# Patient Record
Sex: MALE | Race: BLACK OR AFRICAN AMERICAN | NOT HISPANIC OR LATINO | Employment: UNEMPLOYED | URBAN - METROPOLITAN AREA
[De-identification: names, ages, dates, MRNs, and addresses within clinical notes are randomized per-mention and may not be internally consistent; named-entity substitution may affect disease eponyms.]

---

## 2018-01-10 NOTE — RESULT NOTES
Message   tsh normalized, will monitor      Verified Results  (1) T4, FREE [id number] 04:41PM Montine Erni     Test Name Result Flag Reference   T4,FREE 1 03 ng/dL  0 76-1 46     (1) TSH [id number] 04:41PM Montine Erin   Patients undergoing fluorescein dye angiography may retain small amounts of fluorescein in the body for 48-72 hours post procedure  Samples containing fluorescein can produce falsely depressed TSH values  If the patient had this procedure,a specimen should be resubmitted post fluorescein clearance       Test Name Result Flag Reference   TSH 0 377 uIU/mL  0 358-3 740     (1) T3 TOTAL [id number] 04:41PM Montine Erin     Test Name Result Flag Reference   T3 1 2 ng/mL  0 6-1 8

## 2018-02-15 PROCEDURE — 81003 URINALYSIS AUTO W/O SCOPE: CPT | Performed by: FAMILY MEDICINE

## 2018-02-15 PROCEDURE — 87147 CULTURE TYPE IMMUNOLOGIC: CPT | Performed by: FAMILY MEDICINE

## 2018-02-15 PROCEDURE — 87186 SC STD MICRODIL/AGAR DIL: CPT | Performed by: FAMILY MEDICINE

## 2018-02-15 PROCEDURE — 87591 N.GONORRHOEAE DNA AMP PROB: CPT | Performed by: FAMILY MEDICINE

## 2018-02-15 PROCEDURE — 87077 CULTURE AEROBIC IDENTIFY: CPT | Performed by: FAMILY MEDICINE

## 2018-02-15 PROCEDURE — 87491 CHLMYD TRACH DNA AMP PROBE: CPT | Performed by: FAMILY MEDICINE

## 2018-02-15 PROCEDURE — 87086 URINE CULTURE/COLONY COUNT: CPT | Performed by: FAMILY MEDICINE

## 2018-02-20 ENCOUNTER — TELEPHONE (OUTPATIENT)
Dept: FAMILY MEDICINE CLINIC | Facility: CLINIC | Age: 28
End: 2018-02-20

## 2018-09-12 ENCOUNTER — APPOINTMENT (OUTPATIENT)
Dept: LAB | Age: 28
End: 2018-09-12
Payer: COMMERCIAL

## 2018-09-12 ENCOUNTER — OFFICE VISIT (OUTPATIENT)
Dept: FAMILY MEDICINE CLINIC | Facility: CLINIC | Age: 28
End: 2018-09-12
Payer: COMMERCIAL

## 2018-09-12 VITALS
SYSTOLIC BLOOD PRESSURE: 102 MMHG | HEIGHT: 71 IN | BODY MASS INDEX: 21.84 KG/M2 | WEIGHT: 156 LBS | DIASTOLIC BLOOD PRESSURE: 60 MMHG | TEMPERATURE: 97.5 F

## 2018-09-12 DIAGNOSIS — Z00.00 ROUTINE ADULT HEALTH MAINTENANCE: ICD-10-CM

## 2018-09-12 DIAGNOSIS — Z11.3 SCREENING EXAMINATION FOR STD (SEXUALLY TRANSMITTED DISEASE): ICD-10-CM

## 2018-09-12 DIAGNOSIS — Z13.220 LIPID SCREENING: Primary | ICD-10-CM

## 2018-09-12 DIAGNOSIS — Z13.220 LIPID SCREENING: ICD-10-CM

## 2018-09-12 LAB
CHOLEST SERPL-MCNC: 164 MG/DL (ref 50–200)
HDLC SERPL-MCNC: 47 MG/DL (ref 40–60)
LDLC SERPL CALC-MCNC: 108 MG/DL (ref 0–100)
TRIGL SERPL-MCNC: 47 MG/DL

## 2018-09-12 PROCEDURE — 86592 SYPHILIS TEST NON-TREP QUAL: CPT

## 2018-09-12 PROCEDURE — 87340 HEPATITIS B SURFACE AG IA: CPT

## 2018-09-12 PROCEDURE — 36415 COLL VENOUS BLD VENIPUNCTURE: CPT

## 2018-09-12 PROCEDURE — 87491 CHLMYD TRACH DNA AMP PROBE: CPT

## 2018-09-12 PROCEDURE — 87389 HIV-1 AG W/HIV-1&-2 AB AG IA: CPT

## 2018-09-12 PROCEDURE — 99395 PREV VISIT EST AGE 18-39: CPT | Performed by: FAMILY MEDICINE

## 2018-09-12 PROCEDURE — 80061 LIPID PANEL: CPT

## 2018-09-12 PROCEDURE — 87591 N.GONORRHOEAE DNA AMP PROB: CPT

## 2018-09-12 NOTE — PROGRESS NOTES
Assessment/Plan:    27-year-old male with: Annual health maintenance exam   Discussed various safety and health maintenance issues including healthy diet like the Mediterranean diet, exercise, healthy weight as tolerated, ample sleep and stress reduction  Discussed supportive care return parameters  Will check fasting blood work along with STD screening and call patient with results  Follow-up yearly and as needed  No problem-specific Assessment & Plan notes found for this encounter  Diagnoses and all orders for this visit:    Lipid screening  -     Lipid Panel with Direct LDL reflex; Future  -     Glucose, fasting; Future  -     HIV 1/2 AG-AB combo; Future  -     RPR; Future  -     Hepatitis B surface antigen; Future  -     Chlamydia/GC amplified DNA by PCR; Future    Screening examination for STD (sexually transmitted disease)  -     Lipid Panel with Direct LDL reflex; Future  -     Glucose, fasting; Future  -     HIV 1/2 AG-AB combo; Future  -     RPR; Future  -     Hepatitis B surface antigen; Future  -     Chlamydia/GC amplified DNA by PCR; Future    Routine adult health maintenance          Subjective:   Routine health maintenance     Patient ID: Radha Trujillo is a 29 y o  male  Patient is a 27-year-old male who presents for an annual well visit  Patient admits he is physically active in generally eats a healthy diet and sleeps well  He is interested in STD screening but overall feels that he is doing well  No acute complaints  The following portions of the patient's history were reviewed and updated as appropriate: allergies, current medications, past family history, past medical history, past social history, past surgical history and problem list     Review of Systems   Constitutional: Negative  HENT: Negative  Eyes: Negative  Respiratory: Negative  Cardiovascular: Negative  Gastrointestinal: Negative  Endocrine: Negative  Genitourinary: Negative  Musculoskeletal: Negative  Allergic/Immunologic: Negative  Neurological: Negative  Hematological: Negative  Psychiatric/Behavioral: Negative  All other systems reviewed and are negative  Objective:      /60 (BP Location: Left arm, Patient Position: Sitting, Cuff Size: Standard)   Temp 97 5 °F (36 4 °C) (Tympanic)   Ht 5' 11" (1 803 m)   Wt 70 8 kg (156 lb)   BMI 21 76 kg/m²          Physical Exam   Constitutional: He is oriented to person, place, and time  He appears well-developed and well-nourished  HENT:   Head: Atraumatic  Right Ear: External ear normal    Left Ear: External ear normal    Eyes: Conjunctivae and EOM are normal  Pupils are equal, round, and reactive to light  Neck: Normal range of motion  Cardiovascular: Normal rate, regular rhythm and normal heart sounds  Pulmonary/Chest: Effort normal and breath sounds normal  No respiratory distress  Abdominal: Soft  He exhibits no distension  There is no tenderness  There is no rebound and no guarding  Musculoskeletal: Normal range of motion  Neurological: He is alert and oriented to person, place, and time  No cranial nerve deficit  Skin: Skin is warm and dry  Psychiatric: He has a normal mood and affect   His behavior is normal  Judgment and thought content normal

## 2018-09-13 LAB
CHLAMYDIA DNA CVX QL NAA+PROBE: NORMAL
HBV SURFACE AG SER QL: NORMAL
N GONORRHOEA DNA GENITAL QL NAA+PROBE: NORMAL
RPR SER QL: NORMAL

## 2018-09-15 LAB — HIV 1+2 AB+HIV1 P24 AG SERPL QL IA: NORMAL

## 2020-04-17 ENCOUNTER — DOCUMENTATION (OUTPATIENT)
Dept: URGENT CARE | Age: 30
End: 2020-04-17

## 2020-04-17 ENCOUNTER — TELEPHONE (OUTPATIENT)
Dept: FAMILY MEDICINE CLINIC | Facility: CLINIC | Age: 30
End: 2020-04-17

## 2020-04-17 ENCOUNTER — TELEMEDICINE (OUTPATIENT)
Dept: FAMILY MEDICINE CLINIC | Facility: CLINIC | Age: 30
End: 2020-04-17
Payer: COMMERCIAL

## 2020-04-17 DIAGNOSIS — Z20.828 EXPOSURE TO SARS-ASSOCIATED CORONAVIRUS: ICD-10-CM

## 2020-04-17 DIAGNOSIS — Z20.828 EXPOSURE TO SARS-ASSOCIATED CORONAVIRUS: Primary | ICD-10-CM

## 2020-04-17 PROCEDURE — 87635 SARS-COV-2 COVID-19 AMP PRB: CPT

## 2020-04-17 PROCEDURE — 99214 OFFICE O/P EST MOD 30 MIN: CPT | Performed by: FAMILY MEDICINE

## 2020-04-19 LAB — SARS-COV-2 RNA SPEC QL NAA+PROBE: DETECTED

## 2020-04-20 ENCOUNTER — TELEMEDICINE (OUTPATIENT)
Dept: FAMILY MEDICINE CLINIC | Facility: CLINIC | Age: 30
End: 2020-04-20
Payer: COMMERCIAL

## 2020-04-20 VITALS — HEIGHT: 71 IN | BODY MASS INDEX: 24.22 KG/M2 | WEIGHT: 173 LBS

## 2020-04-20 DIAGNOSIS — U07.1 COVID-19 VIRUS INFECTION: Primary | ICD-10-CM

## 2020-04-20 PROCEDURE — 99213 OFFICE O/P EST LOW 20 MIN: CPT | Performed by: FAMILY MEDICINE

## 2020-04-20 RX ORDER — ACETAMINOPHEN 325 MG/1
650 TABLET ORAL EVERY 6 HOURS PRN
COMMUNITY

## 2020-05-15 ENCOUNTER — TELEMEDICINE (OUTPATIENT)
Dept: FAMILY MEDICINE CLINIC | Facility: CLINIC | Age: 30
End: 2020-05-15
Payer: COMMERCIAL

## 2020-05-15 DIAGNOSIS — U07.1 COVID-19 VIRUS INFECTION: Primary | ICD-10-CM

## 2020-05-15 PROCEDURE — 99213 OFFICE O/P EST LOW 20 MIN: CPT | Performed by: FAMILY MEDICINE

## 2020-05-15 RX ORDER — ALBUTEROL SULFATE 90 UG/1
2 AEROSOL, METERED RESPIRATORY (INHALATION) EVERY 4 HOURS PRN
Qty: 1 INHALER | Refills: 0 | Status: SHIPPED | OUTPATIENT
Start: 2020-05-15

## 2020-06-01 ENCOUNTER — TELEMEDICINE (OUTPATIENT)
Dept: FAMILY MEDICINE CLINIC | Facility: CLINIC | Age: 30
End: 2020-06-01
Payer: COMMERCIAL

## 2020-06-01 VITALS — WEIGHT: 173 LBS | BODY MASS INDEX: 24.22 KG/M2 | HEIGHT: 71 IN

## 2020-06-01 DIAGNOSIS — R79.89 LOW TSH LEVEL: ICD-10-CM

## 2020-06-01 DIAGNOSIS — R05.9 COUGH: ICD-10-CM

## 2020-06-01 DIAGNOSIS — M25.531 PAIN OF BOTH WRIST JOINTS: ICD-10-CM

## 2020-06-01 DIAGNOSIS — M25.532 PAIN OF BOTH WRIST JOINTS: ICD-10-CM

## 2020-06-01 DIAGNOSIS — R07.1 CHEST PAIN ON BREATHING: Primary | ICD-10-CM

## 2020-06-01 PROBLEM — M25.539 ARTHRALGIA OF WRIST: Status: ACTIVE | Noted: 2020-06-01

## 2020-06-01 PROCEDURE — 99214 OFFICE O/P EST MOD 30 MIN: CPT | Performed by: FAMILY MEDICINE

## 2020-06-01 PROCEDURE — 3008F BODY MASS INDEX DOCD: CPT | Performed by: FAMILY MEDICINE

## 2020-06-01 RX ORDER — AZITHROMYCIN 250 MG/1
TABLET, FILM COATED ORAL
Qty: 6 TABLET | Refills: 0 | Status: SHIPPED | OUTPATIENT
Start: 2020-06-01 | End: 2020-06-05

## 2020-06-01 RX ORDER — PREDNISONE 10 MG/1
TABLET ORAL
Qty: 45 TABLET | Refills: 0 | Status: SHIPPED | OUTPATIENT
Start: 2020-06-01

## 2020-06-02 ENCOUNTER — TELEPHONE (OUTPATIENT)
Dept: FAMILY MEDICINE CLINIC | Facility: CLINIC | Age: 30
End: 2020-06-02

## 2020-06-03 ENCOUNTER — APPOINTMENT (OUTPATIENT)
Dept: LAB | Age: 30
End: 2020-06-03
Payer: COMMERCIAL

## 2020-06-03 ENCOUNTER — APPOINTMENT (OUTPATIENT)
Dept: RADIOLOGY | Age: 30
End: 2020-06-03
Payer: COMMERCIAL

## 2020-06-03 DIAGNOSIS — M25.531 PAIN OF BOTH WRIST JOINTS: ICD-10-CM

## 2020-06-03 DIAGNOSIS — R05.9 COUGH: ICD-10-CM

## 2020-06-03 DIAGNOSIS — R07.1 CHEST PAIN ON BREATHING: ICD-10-CM

## 2020-06-03 DIAGNOSIS — R79.89 LOW TSH LEVEL: ICD-10-CM

## 2020-06-03 DIAGNOSIS — M25.532 PAIN OF BOTH WRIST JOINTS: ICD-10-CM

## 2020-06-03 LAB
ALBUMIN SERPL BCP-MCNC: 4.2 G/DL (ref 3.5–5)
ALP SERPL-CCNC: 41 U/L (ref 46–116)
ALT SERPL W P-5'-P-CCNC: 87 U/L (ref 12–78)
ANION GAP SERPL CALCULATED.3IONS-SCNC: 5 MMOL/L (ref 4–13)
AST SERPL W P-5'-P-CCNC: 28 U/L (ref 5–45)
BASOPHILS # BLD AUTO: 0.06 THOUSANDS/ΜL (ref 0–0.1)
BASOPHILS NFR BLD AUTO: 1 % (ref 0–1)
BILIRUB SERPL-MCNC: 1.11 MG/DL (ref 0.2–1)
BUN SERPL-MCNC: 10 MG/DL (ref 5–25)
CALCIUM SERPL-MCNC: 9.4 MG/DL (ref 8.3–10.1)
CHLORIDE SERPL-SCNC: 105 MMOL/L (ref 100–108)
CHOLEST SERPL-MCNC: 188 MG/DL (ref 50–200)
CO2 SERPL-SCNC: 29 MMOL/L (ref 21–32)
CREAT SERPL-MCNC: 0.98 MG/DL (ref 0.6–1.3)
CRP SERPL QL: <3 MG/L
EOSINOPHIL # BLD AUTO: 0.2 THOUSAND/ΜL (ref 0–0.61)
EOSINOPHIL NFR BLD AUTO: 4 % (ref 0–6)
ERYTHROCYTE [DISTWIDTH] IN BLOOD BY AUTOMATED COUNT: 12.5 % (ref 11.6–15.1)
GFR SERPL CREATININE-BSD FRML MDRD: 120 ML/MIN/1.73SQ M
GLUCOSE P FAST SERPL-MCNC: 84 MG/DL (ref 65–99)
HCT VFR BLD AUTO: 47.2 % (ref 36.5–49.3)
HDLC SERPL-MCNC: 42 MG/DL
HGB BLD-MCNC: 15.1 G/DL (ref 12–17)
IMM GRANULOCYTES # BLD AUTO: 0.02 THOUSAND/UL (ref 0–0.2)
IMM GRANULOCYTES NFR BLD AUTO: 0 % (ref 0–2)
LDLC SERPL CALC-MCNC: 132 MG/DL (ref 0–100)
LYMPHOCYTES # BLD AUTO: 2.38 THOUSANDS/ΜL (ref 0.6–4.47)
LYMPHOCYTES NFR BLD AUTO: 53 % (ref 14–44)
MCH RBC QN AUTO: 29.1 PG (ref 26.8–34.3)
MCHC RBC AUTO-ENTMCNC: 32 G/DL (ref 31.4–37.4)
MCV RBC AUTO: 91 FL (ref 82–98)
MONOCYTES # BLD AUTO: 0.37 THOUSAND/ΜL (ref 0.17–1.22)
MONOCYTES NFR BLD AUTO: 8 % (ref 4–12)
NEUTROPHILS # BLD AUTO: 1.52 THOUSANDS/ΜL (ref 1.85–7.62)
NEUTS SEG NFR BLD AUTO: 34 % (ref 43–75)
NONHDLC SERPL-MCNC: 146 MG/DL
NRBC BLD AUTO-RTO: 0 /100 WBCS
PLATELET # BLD AUTO: 211 THOUSANDS/UL (ref 149–390)
PMV BLD AUTO: 11.1 FL (ref 8.9–12.7)
POTASSIUM SERPL-SCNC: 4 MMOL/L (ref 3.5–5.3)
PROT SERPL-MCNC: 7.9 G/DL (ref 6.4–8.2)
RBC # BLD AUTO: 5.19 MILLION/UL (ref 3.88–5.62)
RHEUMATOID FACT SER QL LA: NEGATIVE
SODIUM SERPL-SCNC: 139 MMOL/L (ref 136–145)
TRIGL SERPL-MCNC: 70 MG/DL
TSH SERPL DL<=0.05 MIU/L-ACNC: 0.53 UIU/ML (ref 0.36–3.74)
WBC # BLD AUTO: 4.55 THOUSAND/UL (ref 4.31–10.16)

## 2020-06-03 PROCEDURE — 86618 LYME DISEASE ANTIBODY: CPT

## 2020-06-03 PROCEDURE — 80053 COMPREHEN METABOLIC PANEL: CPT

## 2020-06-03 PROCEDURE — 86140 C-REACTIVE PROTEIN: CPT

## 2020-06-03 PROCEDURE — 86430 RHEUMATOID FACTOR TEST QUAL: CPT

## 2020-06-03 PROCEDURE — 71046 X-RAY EXAM CHEST 2 VIEWS: CPT

## 2020-06-03 PROCEDURE — 84443 ASSAY THYROID STIM HORMONE: CPT

## 2020-06-03 PROCEDURE — 36415 COLL VENOUS BLD VENIPUNCTURE: CPT

## 2020-06-03 PROCEDURE — 85025 COMPLETE CBC W/AUTO DIFF WBC: CPT

## 2020-06-03 PROCEDURE — 80061 LIPID PANEL: CPT

## 2020-06-04 LAB — B BURGDOR IGG+IGM SER-ACNC: <0.91 ISR (ref 0–0.9)

## 2020-06-15 ENCOUNTER — TELEPHONE (OUTPATIENT)
Dept: FAMILY MEDICINE CLINIC | Facility: CLINIC | Age: 30
End: 2020-06-15

## 2020-07-28 ENCOUNTER — TELEPHONE (OUTPATIENT)
Dept: FAMILY MEDICINE CLINIC | Facility: CLINIC | Age: 30
End: 2020-07-28

## 2021-06-04 ENCOUNTER — HOSPITAL ENCOUNTER (EMERGENCY)
Facility: HOSPITAL | Age: 31
Discharge: HOME | End: 2021-06-04
Attending: EMERGENCY MEDICINE | Admitting: EMERGENCY MEDICINE
Payer: COMMERCIAL

## 2021-06-04 VITALS
SYSTOLIC BLOOD PRESSURE: 126 MMHG | RESPIRATION RATE: 16 BRPM | BODY MASS INDEX: 25.24 KG/M2 | HEART RATE: 67 BPM | DIASTOLIC BLOOD PRESSURE: 61 MMHG | TEMPERATURE: 98.6 F | OXYGEN SATURATION: 98 % | WEIGHT: 181 LBS

## 2021-06-04 DIAGNOSIS — R51.9 HEADACHE: ICD-10-CM

## 2021-06-04 DIAGNOSIS — R52 GENERALIZED BODY ACHES: ICD-10-CM

## 2021-06-04 DIAGNOSIS — B34.9 ACUTE VIRAL SYNDROME: ICD-10-CM

## 2021-06-04 DIAGNOSIS — J02.9 ACUTE VIRAL PHARYNGITIS: Primary | ICD-10-CM

## 2021-06-04 DIAGNOSIS — R53.83 FATIGUE: ICD-10-CM

## 2021-06-04 DIAGNOSIS — R43.0 LOSS OF SMELL: ICD-10-CM

## 2021-06-04 LAB — SARS-COV-2 RNA RESP QL NAA+PROBE: NEGATIVE

## 2021-06-04 PROCEDURE — U0005 INFEC AGEN DETEC AMPLI PROBE: HCPCS | Performed by: EMERGENCY MEDICINE

## 2021-06-04 PROCEDURE — 96372 THER/PROPH/DIAG INJ SC/IM: CPT

## 2021-06-04 PROCEDURE — U0003 INFECTIOUS AGENT DETECTION BY NUCLEIC ACID (DNA OR RNA); SEVERE ACUTE RESPIRATORY SYNDROME CORONAVIRUS 2 (SARS-COV-2) (CORONAVIRUS DISEASE [COVID-19]), AMPLIFIED PROBE TECHNIQUE, MAKING USE OF HIGH THROUGHPUT TECHNOLOGIES AS DESCRIBED BY CMS-2020-01-R: HCPCS | Performed by: EMERGENCY MEDICINE

## 2021-06-04 PROCEDURE — 99283 EMERGENCY DEPT VISIT LOW MDM: CPT

## 2021-06-04 PROCEDURE — 99284 EMERGENCY DEPT VISIT MOD MDM: CPT | Performed by: EMERGENCY MEDICINE

## 2021-06-04 RX ORDER — KETOROLAC TROMETHAMINE 30 MG/ML
30 INJECTION, SOLUTION INTRAMUSCULAR; INTRAVENOUS ONCE
Status: COMPLETED | OUTPATIENT
Start: 2021-06-04 | End: 2021-06-04

## 2021-06-04 RX ORDER — ACETAMINOPHEN 325 MG/1
650 TABLET ORAL ONCE
Status: COMPLETED | OUTPATIENT
Start: 2021-06-04 | End: 2021-06-04

## 2021-06-04 RX ADMIN — ACETAMINOPHEN 650 MG: 325 TABLET, FILM COATED ORAL at 22:25

## 2021-06-04 RX ADMIN — KETOROLAC TROMETHAMINE 30 MG: 30 INJECTION, SOLUTION INTRAMUSCULAR; INTRAVENOUS at 22:25

## 2021-06-04 NOTE — Clinical Note
Mariama Jo was seen and treated in our emergency department on 6/4/2021  Other - See Comments        Diagnosis: Viral syndrome    Ajit    He may return on this date:     PENDING COVID19 TEST RESULT  PATIENT MUST SELF-QUARANTINE AT HOME UNTIL NEGATIVE RESULT IS BACK OR IF POSITIVE YOU HAVE QUARANTINED 10 DAYS FROM SYMPTOM ONSET  If you have any questions or concerns, please don't hesitate to call        Amie Guevara, DO    ______________________________           _______________          _______________  Hospital Representative                              Date                                Time

## 2021-06-05 NOTE — DISCHARGE INSTRUCTIONS
Tylenol and motrin as needed for headache/sore throat and body aches, can gargle with salt water as needed  You will get results of COVID test in next 24 hours - quarantine until that time

## 2021-06-05 NOTE — ED PROVIDER NOTES
History  Chief Complaint   Patient presents with    Sore Throat     Pt c/o sore throat, headache, fatigue since last night  reports " I feel like when I had COVID the first time"   Headache     26 yo male who presents with sore throat, headache, body aches and fatigue last night  Also reports inability to smell anything  Symptoms similar to last marh when he had COVID  History provided by:  Patient   used: No    Sore Throat  Location:  Generalized  Quality:  Aching  Severity:  Mild  Onset quality:  Gradual  Duration:  1 day (began last night)  Timing:  Constant  Progression:  Unchanged  Chronicity:  New  Relieved by:  Nothing  Worsened by:  Nothing  Ineffective treatments:  None tried  Associated symptoms: headaches    Associated symptoms: no abdominal pain, no adenopathy, no chest pain, no chills, no cough, no epistaxis, no fever, no neck stiffness, no rash, no rhinorrhea, no shortness of breath and no trouble swallowing    Headaches:     Severity:  Moderate    Onset quality:  Gradual    Duration:  1 day    Timing:  Constant    Progression:  Unchanged    Chronicity:  New  Risk factors: no sick contacts    Headache  Associated symptoms: fatigue, myalgias and sore throat    Associated symptoms: no abdominal pain, no congestion, no cough, no diarrhea, no fever, no nausea, no neck pain, no neck stiffness and no vomiting        Prior to Admission Medications   Prescriptions Last Dose Informant Patient Reported? Taking?   acetaminophen (TYLENOL) 325 mg tablet   Yes No   Sig: Take 650 mg by mouth every 6 (six) hours as needed for mild pain   albuterol (Ventolin HFA) 90 mcg/act inhaler   No No   Sig: Inhale 2 puffs every 4 (four) hours as needed for wheezing   predniSONE 10 mg tablet   No No   Si pills daily for 3 days, 4 for 3 days, 3 for 3 days, 2 for 3 days, 1 for 3 days      Facility-Administered Medications: None       History reviewed  No pertinent past medical history      Past Surgical History:   Procedure Laterality Date    DENTAL SURGERY      NO PAST SURGERIES      DENIED HX OF PRIOR SURGERY       Family History   Problem Relation Age of Onset    Hypertension Mother         ESSENTIAL    Diabetes Maternal Grandmother         MELLITUS    Diabetes Paternal Grandmother         MELLITUS     I have reviewed and agree with the history as documented  E-Cigarette/Vaping    E-Cigarette Use Never User      E-Cigarette/Vaping Substances    Nicotine No     THC No     CBD No     Flavoring No     Other No     Unknown No      Social History     Tobacco Use    Smoking status: Never Smoker    Smokeless tobacco: Never Used   Substance Use Topics    Alcohol use: No     Comment: SOCIAL ALCOHOL USE AS PER [employer]    Drug use: No       Review of Systems   Constitutional: Positive for fatigue  Negative for chills and fever  Loss of smell   HENT: Positive for sore throat  Negative for congestion, nosebleeds, rhinorrhea and trouble swallowing  Eyes: Negative for visual disturbance  Respiratory: Negative for cough, chest tightness, shortness of breath and wheezing  Cardiovascular: Negative for chest pain and palpitations  Gastrointestinal: Negative for abdominal pain, diarrhea, nausea and vomiting  Genitourinary: Negative for dysuria  Musculoskeletal: Positive for myalgias  Negative for neck pain and neck stiffness  Skin: Negative for pallor and rash  Neurological: Positive for headaches  Hematological: Negative for adenopathy  Psychiatric/Behavioral: Negative for confusion  All other systems reviewed and are negative  Physical Exam  Physical Exam  Vitals signs and nursing note reviewed  Constitutional:       General: He is not in acute distress  Appearance: He is well-developed  HENT:      Head: Normocephalic and atraumatic  Right Ear: External ear normal       Left Ear: External ear normal       Nose: No congestion or rhinorrhea  Mouth/Throat:      Mouth: Mucous membranes are moist       Pharynx: Posterior oropharyngeal erythema present  No pharyngeal swelling or oropharyngeal exudate  Tonsils: No tonsillar exudate  Neck:      Musculoskeletal: Normal range of motion and neck supple  Cardiovascular:      Rate and Rhythm: Normal rate and regular rhythm  Heart sounds: No murmur  Pulmonary:      Effort: Pulmonary effort is normal  No respiratory distress  Breath sounds: Normal breath sounds  No rales  Abdominal:      General: Bowel sounds are normal  There is no distension  Palpations: Abdomen is soft  Tenderness: There is no abdominal tenderness  Musculoskeletal: Normal range of motion  Lymphadenopathy:      Cervical: No cervical adenopathy  Skin:     General: Skin is warm  Coloration: Skin is not pale  Findings: No rash  Neurological:      General: No focal deficit present  Mental Status: He is alert and oriented to person, place, and time     Psychiatric:         Behavior: Behavior normal          Vital Signs  ED Triage Vitals   Temperature Pulse Respirations Blood Pressure SpO2   06/04/21 2132 06/04/21 2134 06/04/21 2134 06/04/21 2134 06/04/21 2134   98 6 °F (37 °C) 67 16 126/61 98 %      Temp Source Heart Rate Source Patient Position - Orthostatic VS BP Location FiO2 (%)   06/04/21 2132 06/04/21 2134 06/04/21 2134 06/04/21 2134 --   Oral Monitor Sitting Left arm       Pain Score       06/04/21 2134       6           Vitals:    06/04/21 2134   BP: 126/61   Pulse: 67   Patient Position - Orthostatic VS: Sitting         Visual Acuity      ED Medications  Medications   ketorolac (TORADOL) injection 30 mg (has no administration in time range)   acetaminophen (TYLENOL) tablet 650 mg (has no administration in time range)       Diagnostic Studies  Results Reviewed     Procedure Component Value Units Date/Time    Novel Coronavirus (Covid-19),PCR [facility] - 24 Hour Routine [[id number]]     Lab Status: No result Specimen: Nares from Nose                  No orders to display              Procedures  Procedures         ED Course  ED Course as of Jun 04 2212 Fri Jun 04, 2021   2156 Pt seen and examined  26 yo male who presents with sore throat, headache and fatigue last night  Also reports inability to smell anything  Symptoms similar to last marh when he had COVID  Will give IM toradol, tylenol and swab for COVID  SBIRT 22yo+      Most Recent Value   SBIRT (24 yo +)   In order to provide better care to our patients, we are screening all of our patients for alcohol and drug use  Would it be okay to ask you these screening questions? No Filed at: 06/04/2021 2143                    MDM    Disposition  Final diagnoses:   Acute viral pharyngitis   Headache   Fatigue   Generalized body aches   Loss of smell   Acute viral syndrome     Time reflects when diagnosis was documented in both MDM as applicable and the Disposition within this note     Time User Action Codes Description Comment    6/4/2021 10:10 PM Brittani ROONEY Add [J02 9] Acute viral pharyngitis     6/4/2021 10:10 PM Marlyce Skillern Add [R51 9] Headache     6/4/2021 10:10 PM Chuye Skillern Add [R53 83] Fatigue     6/4/2021 10:10 PM Chuye Skillern Add [R52] Generalized body aches     6/4/2021 10:10 PM Brittani ROONEY Add [R43 0] Loss of smell     6/4/2021 10:10 PM Brittani ROONEY Add [B34 9] Acute viral syndrome       ED Disposition     ED Disposition Condition Date/Time Comment    Discharge Stable Fri Jun 4, 2021 10:09 PM Aaron Venegas discharge to home/self care  Follow-up Information     Follow up With Specialties Details Why Contact Info    Kiki Carrizales MD Family Medicine  As needed [facility]  [phone]            Patient's Medications   Discharge Prescriptions    No medications on file     No discharge procedures on file      PDMP Review     None ED Provider  Electronically Signed by           Jerene Hatchet, DO  06/04/21 1078

## 2022-09-13 LAB — HBA1C MFR BLD HPLC: 4.6 %

## 2022-09-19 ENCOUNTER — OFFICE VISIT (OUTPATIENT)
Dept: INTERNAL MEDICINE CLINIC | Facility: OTHER | Age: 32
End: 2022-09-19
Payer: COMMERCIAL

## 2022-09-19 VITALS
HEIGHT: 71 IN | TEMPERATURE: 98.7 F | WEIGHT: 174 LBS | DIASTOLIC BLOOD PRESSURE: 68 MMHG | HEART RATE: 66 BPM | OXYGEN SATURATION: 97 % | SYSTOLIC BLOOD PRESSURE: 110 MMHG | BODY MASS INDEX: 24.36 KG/M2

## 2022-09-19 DIAGNOSIS — M25.50 ARTHRALGIA, UNSPECIFIED JOINT: ICD-10-CM

## 2022-09-19 DIAGNOSIS — E55.9 VITAMIN D DEFICIENCY: Primary | ICD-10-CM

## 2022-09-19 PROCEDURE — 99203 OFFICE O/P NEW LOW 30 MIN: CPT | Performed by: NURSE PRACTITIONER

## 2022-09-19 PROCEDURE — 3725F SCREEN DEPRESSION PERFORMED: CPT | Performed by: NURSE PRACTITIONER

## 2022-09-19 RX ORDER — ERGOCALCIFEROL (VITAMIN D2) 1250 MCG
50000 CAPSULE ORAL WEEKLY
Qty: 12 CAPSULE | Refills: 0 | Status: SHIPPED | OUTPATIENT
Start: 2022-09-19

## 2022-09-19 NOTE — ASSESSMENT & PLAN NOTE
Arthralgias of multiple joints, patient had autoimmune workup done however line was not included  However patient does report that his symptoms have almost completely resolved since starting a multivitamin  Advised patient to continue to monitor his symptoms and if his symptoms reappear return to office for evaluation

## 2022-09-19 NOTE — ASSESSMENT & PLAN NOTE
Will start patient on ergocalciferol 28535 units to take once weekly for the next 12 weeks, patient is to get follow-up blood work which will include a vitamin-D level after she finishes 12 week course  After patient finishes vitamin-D supplementation with 34078 units, patient is to take 2000 International Units of vitamin-D daily

## 2022-09-19 NOTE — PROGRESS NOTES
Assessment/Plan:    Vitamin D deficiency  Will start patient on ergocalciferol 44437 units to take once weekly for the next 12 weeks, patient is to get follow-up blood work which will include a vitamin-D level after she finishes 12 week course  After patient finishes vitamin-D supplementation with 95578 units, patient is to take 2000 International Units of vitamin-D daily  Arthralgia  Arthralgias of multiple joints, patient had autoimmune workup done however line was not included  However patient does report that his symptoms have almost completely resolved since starting a multivitamin  Advised patient to continue to monitor his symptoms and if his symptoms reappear return to office for evaluation  Diagnoses and all orders for this visit:    Vitamin D deficiency  -     ergocalciferol (ERGOCALCIFEROL) 1 25 MG (35047 UT) capsule; Take 1 capsule (50,000 Units total) by mouth once a week    Arthralgia, unspecified joint          Subjective:      Patient ID: Modesto Saenz is a 28 y o  male  Modesto Saenz is a 28year old male with no significant past medical history presenting to the office today with joint pain and to establish care  Patient did a virtual telemedicine appointment on 9/15/22 for joint pain and had labs ordered  His labs were reviewed today and were all within normal limits besides his Vit D  His Vit D level was low  He denies any past medical history  His B12 level also noted to be on the lower side, but not abnormal      Patient reports he began feeling joint pain in his wrists, elbows, fingers, and knees  He reports the pain is worse in the morning and it gets better throughout the day  The joint pain began two weeks ago  He also reports a loss of energy and one episode of lightheadedness two weeks ago  Denies any other episodes of lightheadedness  Patient states the joint pain has improved since he began taking a multivitamin 3 days ago   He denies a family history of autoimmune conditions  Patient reports he gets 6-7 hours of sleep a night  Patient reports drinking 2 bottles of water a day  He acknowledges he needs to drink more water  Patient describes his diet as well balanced  He is      Patient reports drinking beer once a month and denies any tobacco or illicit drug use  Mother has hypertension  Dad has elevated fasting glucose        The following portions of the patient's history were reviewed and updated as appropriate: allergies, current medications, past family history, past medical history, past social history, past surgical history and problem list     Review of Systems   Constitutional: Positive for fatigue  Negative for activity change, appetite change, chills, diaphoresis, fever and unexpected weight change  HENT: Negative for congestion, ear discharge, ear pain, hearing loss, postnasal drip, rhinorrhea, sinus pressure, sinus pain and sore throat  Eyes: Negative for pain, discharge, itching and visual disturbance  Respiratory: Negative for cough, chest tightness, shortness of breath and wheezing  Cardiovascular: Negative for chest pain, palpitations and leg swelling  Gastrointestinal: Negative for abdominal pain, constipation, diarrhea, nausea and vomiting  Endocrine: Negative for polydipsia, polyphagia and polyuria  Genitourinary: Negative for difficulty urinating, dysuria and urgency  Musculoskeletal: Positive for arthralgias  Negative for back pain, joint swelling and neck pain  Skin: Negative for rash and wound  Neurological: Positive for light-headedness and headaches  Negative for dizziness, weakness and numbness  Psychiatric/Behavioral: Negative for dysphoric mood  The patient is not nervous/anxious            Objective:      /68 (BP Location: Left arm, Patient Position: Sitting, Cuff Size: Adult)   Pulse 66   Temp 98 7 °F (37 1 °C) (Temporal)   Ht 5' 11" (1 803 m)   Wt 78 9 kg (174 lb)   SpO2 97%   BMI 24 27 kg/m²          Physical Exam  Constitutional:       General: He is not in acute distress  Appearance: He is well-developed  He is not diaphoretic  HENT:      Head: Normocephalic and atraumatic  Right Ear: External ear normal       Left Ear: External ear normal       Nose: Nose normal       Mouth/Throat:      Pharynx: No oropharyngeal exudate  Eyes:      General:         Right eye: No discharge  Left eye: No discharge  Conjunctiva/sclera: Conjunctivae normal       Pupils: Pupils are equal, round, and reactive to light  Neck:      Thyroid: No thyromegaly  Cardiovascular:      Rate and Rhythm: Normal rate and regular rhythm  Heart sounds: Normal heart sounds  No murmur heard  No friction rub  No gallop  Pulmonary:      Effort: Pulmonary effort is normal  No respiratory distress  Breath sounds: Normal breath sounds  No stridor  No wheezing or rales  Abdominal:      General: Bowel sounds are normal  There is no distension  Palpations: Abdomen is soft  Tenderness: There is no abdominal tenderness  Musculoskeletal:      Cervical back: Normal range of motion and neck supple  Lymphadenopathy:      Cervical: No cervical adenopathy  Skin:     General: Skin is warm and dry  Findings: No erythema or rash  Neurological:      Mental Status: He is alert and oriented to person, place, and time  Psychiatric:         Behavior: Behavior normal          Thought Content:  Thought content normal          Judgment: Judgment normal

## 2022-12-14 ENCOUNTER — OFFICE VISIT (OUTPATIENT)
Dept: INTERNAL MEDICINE CLINIC | Age: 32
End: 2022-12-14

## 2022-12-14 VITALS
DIASTOLIC BLOOD PRESSURE: 76 MMHG | TEMPERATURE: 98.3 F | OXYGEN SATURATION: 97 % | SYSTOLIC BLOOD PRESSURE: 120 MMHG | WEIGHT: 175 LBS | HEIGHT: 71 IN | HEART RATE: 66 BPM | BODY MASS INDEX: 24.5 KG/M2

## 2022-12-14 DIAGNOSIS — J06.9 URTI (ACUTE UPPER RESPIRATORY INFECTION): ICD-10-CM

## 2022-12-14 DIAGNOSIS — E55.9 VITAMIN D DEFICIENCY: ICD-10-CM

## 2022-12-14 DIAGNOSIS — Z28.21 INFLUENZA VACCINATION DECLINED BY PATIENT: ICD-10-CM

## 2022-12-14 DIAGNOSIS — J02.8 PHARYNGITIS DUE TO OTHER ORGANISM: ICD-10-CM

## 2022-12-14 DIAGNOSIS — Z11.59 ENCOUNTER FOR HEPATITIS C SCREENING TEST FOR LOW RISK PATIENT: ICD-10-CM

## 2022-12-14 DIAGNOSIS — Z00.00 ANNUAL PHYSICAL EXAM: Primary | ICD-10-CM

## 2022-12-14 PROBLEM — J02.9 PHARYNGITIS: Status: ACTIVE | Noted: 2022-12-14

## 2022-12-14 RX ORDER — AMOXICILLIN AND CLAVULANATE POTASSIUM 875; 125 MG/1; MG/1
1 TABLET, FILM COATED ORAL EVERY 12 HOURS SCHEDULED
Qty: 14 TABLET | Refills: 0 | Status: SHIPPED | OUTPATIENT
Start: 2022-12-14 | End: 2022-12-21

## 2022-12-14 RX ORDER — NEOMYCIN/POLYMYXIN B/PRAMOXINE 3.5-10K-1
1 CREAM (GRAM) TOPICAL DAILY
COMMUNITY

## 2022-12-14 NOTE — PATIENT INSTRUCTIONS
Wellness Visit for Adults   AMBULATORY CARE:   A wellness visit  is when you see your healthcare provider to get screened for health problems  Your healthcare provider will also give you advice on how to stay healthy  Write down your questions so you remember to ask them  Ask your healthcare provider how often you should have a wellness visit  What happens at a wellness visit:  Your healthcare provider will ask about your health, and your family history of health problems  This includes high blood pressure, heart disease, and cancer  He or she will ask if you have symptoms that concern you, if you smoke, and about your mood  You may also be asked about your intake of medicines, supplements, food, and alcohol  Any of the following may be done: Your weight  will be checked  Your height may also be checked so your body mass index (BMI) can be calculated  Your BMI shows if you are at a healthy weight  Your blood pressure  and heart rate will be checked  Your temperature may also be checked  Blood and urine tests  may be done  Blood tests may be done to check your cholesterol levels  Abnormal cholesterol levels increase your risk for heart disease and stroke  You may also need a blood or urine test to check for diabetes if you are at increased risk  Urine tests may be done to look for signs of an infection or kidney disease  A physical exam  includes checking your heartbeat and lungs with a stethoscope  Your healthcare provider may also check your skin to look for sun damage  Screening tests  may be recommended  A screening test is done to check for diseases that may not cause symptoms  The screening tests you may need depend on your age, gender, family history, and lifestyle habits  For example, colorectal screening may be recommended if you are 48years old or older  Screening tests you need if you are a woman:   A Pap smear  is used to screen for cervical cancer   Pap smears are usually done every 3 to 5 years depending on your age  You may need them more often if you have had abnormal Pap smear test results in the past  Ask your healthcare provider how often you should have a Pap smear  A mammogram  is an x-ray of your breasts to screen for breast cancer  Experts recommend mammograms every 2 years starting at age 48 years  You may need a mammogram at age 52 years or younger if you have an increased risk for breast cancer  Talk to your healthcare provider about when you should start having mammograms and how often you need them  Vaccines you may need:   Get an influenza vaccine  every year  The influenza vaccine protects you from the flu  Several types of viruses cause the flu  The viruses change over time, so new vaccines are made each year  Get a tetanus-diphtheria (Td) booster vaccine  every 10 years  This vaccine protects you against tetanus and diphtheria  Tetanus is a severe infection that may cause painful muscle spasms and lockjaw  Diphtheria is a severe bacterial infection that causes a thick covering in the back of your mouth and throat  Get a human papillomavirus (HPV) vaccine  if you are female and aged 23 to 32 or male 23 to 24 and never received it  This vaccine protects you from HPV infection  HPV is the most common infection spread by sexual contact  HPV may also cause vaginal, penile, and anal cancers  Get a pneumococcal vaccine  if you are aged 72 years or older  The pneumococcal vaccine is an injection given to protect you from pneumococcal disease  Pneumococcal disease is an infection caused by pneumococcal bacteria  The infection may cause pneumonia, meningitis, or an ear infection  Get a shingles vaccine  if you are 60 or older, even if you have had shingles before  The shingles vaccine is an injection to protect you from the varicella-zoster virus  This is the same virus that causes chickenpox   Shingles is a painful rash that develops in people who had chickenpox or have been exposed to the virus  How to eat healthy:  My Plate is a model for planning healthy meals  It shows the types and amounts of foods that should go on your plate  Fruits and vegetables make up about half of your plate, and grains and protein make up the other half  A serving of dairy is included on the side of your plate  The amount of calories and serving sizes you need depends on your age, gender, weight, and height  Examples of healthy foods are listed below:  Eat a variety of vegetables  such as dark green, red, and orange vegetables  You can also include canned vegetables low in sodium (salt) and frozen vegetables without added butter or sauces  Eat a variety of fresh fruits , canned fruit in 100% juice, frozen fruit, and dried fruit  Include whole grains  At least half of the grains you eat should be whole grains  Examples include whole-wheat bread, wheat pasta, brown rice, and whole-grain cereals such as oatmeal     Eat a variety of protein foods such as seafood (fish and shellfish), lean meat, and poultry without skin (turkey and chicken)  Examples of lean meats include pork leg, shoulder, or tenderloin, and beef round, sirloin, tenderloin, and extra lean ground beef  Other protein foods include eggs and egg substitutes, beans, peas, soy products, nuts, and seeds  Choose low-fat dairy products such as skim or 1% milk or low-fat yogurt, cheese, and cottage cheese  Limit unhealthy fats  such as butter, hard margarine, and shortening  Exercise:  Exercise at least 30 minutes per day on most days of the week  Some examples of exercise include walking, biking, dancing, and swimming  You can also fit in more physical activity by taking the stairs instead of the elevator or parking farther away from stores  Include muscle strengthening activities 2 days each week  Regular exercise provides many health benefits   It helps you manage your weight, and decreases your risk for type 2 diabetes, heart disease, stroke, and high blood pressure  Exercise can also help improve your mood  Ask your healthcare provider about the best exercise plan for you  General health and safety guidelines:   Do not smoke  Nicotine and other chemicals in cigarettes and cigars can cause lung damage  Ask your healthcare provider for information if you currently smoke and need help to quit  E-cigarettes or smokeless tobacco still contain nicotine  Talk to your healthcare provider before you use these products  Limit alcohol  A drink of alcohol is 12 ounces of beer, 5 ounces of wine, or 1½ ounces of liquor  Lose weight, if needed  Being overweight increases your risk of certain health conditions  These include heart disease, high blood pressure, type 2 diabetes, and certain types of cancer  Protect your skin  Do not sunbathe or use tanning beds  Use sunscreen with a SPF 15 or higher  Apply sunscreen at least 15 minutes before you go outside  Reapply sunscreen every 2 hours  Wear protective clothing, hats, and sunglasses when you are outside  Drive safely  Always wear your seatbelt  Make sure everyone in your car wears a seatbelt  A seatbelt can save your life if you are in an accident  Do not use your cell phone when you are driving  This could distract you and cause an accident  Pull over if you need to make a call or send a text message  Practice safe sex  Use latex condoms if are sexually active and have more than one partner  Your healthcare provider may recommend screening tests for sexually transmitted infections (STIs)  Wear helmets, lifejackets, and protective gear  Always wear a helmet when you ride a bike or motorcycle, go skiing, or play sports that could cause a head injury  Wear protective equipment when you play sports  Wear a lifejacket when you are on a boat or doing water sports      © Copyright [employer] 2022 Information is for End User's use only and may not be sold, redistributed or otherwise used for commercial purposes  All illustrations and images included in CareNotes® are the copyrighted property of [employer] M [employer]  or In[employer]  The above information is an  only  It is not intended as medical advice for individual conditions or treatments  Talk to your doctor, nurse or pharmacist before following any medical regimen to see if it is safe and effective for you

## 2022-12-14 NOTE — PROGRESS NOTES
Assessment/Plan:       Annual physical examination  - History and physical examination done  - Pt was counseled to eat a heart healthy diet, to drink at least 2 L of water daily, to take a daily multivitamin and to exercise for at least 30 minutes of cardio exercise daily, for at least 5 days a week  - CBC, CMP, TSH and lipid panel were done 3 months ago and all results were reviewed with patient   -We will order hepatitis C antibody test and follow-up with the results   -He is up-to-date with the COVID shot x2 and does not take the flu shot  He was counseled to rethink the influenza  shot when he is older as the influenza infection can be severe in geriatric patients  - follow up in 1 year for an annual physical exam or sooner as needed  Upper respiratory tract infection pharyngitis  - likely viral with bacterial superinfection vs bacterial  - we will start pt on Augmentin 875-125 mg twice daily for 7 days and may use over-the-counter Flonase nasal spray for rhinitis and Mucinex for productive cough  - Pt may use OTC tylenol or ibuprofen with meals for aches and pains, warm salt water gargles for sore throat and was encouraged to drink lots of fluids, get plenty of rest and wash his hands liberally  - pt was also counseled to take antibiotics with food and also to use a probiotic like yogurt daily as long as he is taking antibiotics  - He should return to the clinic if his symptoms worsen or do not improve  Vitamin D deficiency  -Status post ergocalciferol for 12 weeks  -We will repeat vitamin D level       Diagnoses and all orders for this visit:    Annual physical exam    URTI (acute upper respiratory infection)  -     amoxicillin-clavulanate (AUGMENTIN) 875-125 mg per tablet; Take 1 tablet by mouth every 12 (twelve) hours for 7 days    Pharyngitis due to other organism  -     amoxicillin-clavulanate (AUGMENTIN) 875-125 mg per tablet;  Take 1 tablet by mouth every 12 (twelve) hours for 7 days    Vitamin D deficiency  -     Vitamin D 25 hydroxy; Future    Encounter for hepatitis C screening test for low risk patient  -     Hepatitis C antibody; Future    Other orders  -     Multiple Vitamins-Minerals (Multi-Vitamin Gummies) CHEW; Chew 1 tablet daily             Subjective:      Patient ID: Refugio Lancaster is a 28 y o  male  HPI    Patient presents for an annual physical exam     Last annual physical exam- more than a year ago    Past medical history- gastritis, ? 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 Mom and ? Dad    Today, patient states that he thinks that he is recovering from a cold  Had a lot of cough that started about 3 weeks ago and also states that occasionally in the morning he has nasal congestion and some sore throat  Admits to occasional chest pain on coughing and states that his cough is productive of yellowish phlegm with some steaks of blood sometimes    He denies fever, chills, myalgias, headache, dizziness, weight loss, weight gain, earache, sinus pain or pressure, shortness of breath, palpitations, nausea, vomiting, abdominal pain, diarrhea, constipation, myalgias, pruritus, hematochezia, hematuria, melena stools, feelings of anxiety, depression  The following portions of the patient's history were reviewed and updated as appropriate:   He  has no past medical history on file  He   Patient Active Problem List    Diagnosis Date Noted   • Vitamin D deficiency 09/19/2022   • Arthralgia 09/19/2022   • Chest pain on breathing 06/01/2020   • Arthralgia of wrist 06/01/2020   • Cough 06/01/2020   • COVID-19 virus infection 04/20/2020   • Lipid screening 09/12/2018   • Screening examination for STD (sexually transmitted disease) 09/12/2018   • Routine adult health maintenance 09/12/2018   • Low TSH level 02/05/2016   • Itching 12/23/2015   • Skin rash 12/23/2015   • Hyperthyroidism 10/29/2015   • Urethritis due to Chlamydia trachomatis 10/20/2014   • Gastritis 09/29/2014     He  has a past surgical history that includes Dental surgery and No past surgeries  His family history includes Diabetes in his maternal grandmother and paternal grandmother; Hypertension in his mother  He  reports that he has never smoked  He has never used smokeless tobacco  He reports that he does not drink alcohol and does not use drugs  Current Outpatient Medications   Medication Sig Dispense Refill   • amoxicillin-clavulanate (AUGMENTIN) 875-125 mg per tablet Take 1 tablet by mouth every 12 (twelve) hours for 7 days 14 tablet 0   • Multiple Vitamins-Minerals (Multi-Vitamin Gummies) CHEW Chew 1 tablet daily     • ergocalciferol (ERGOCALCIFEROL) 1 25 MG (30056 UT) capsule Take 1 capsule (50,000 Units total) by mouth once a week (Patient not taking: Reported on 12/14/2022) 12 capsule 0     No current facility-administered medications for this visit       Current Outpatient Medications on File Prior to Visit   Medication Sig   • Multiple Vitamins-Minerals (Multi-Vitamin Gummies) CHEW Chew 1 tablet daily   • ergocalciferol (ERGOCALCIFEROL) 1 25 MG (27641 UT) capsule Take 1 capsule (50,000 Units total) by mouth once a week (Patient not taking: Reported on 12/14/2022)     No current facility-administered medications on file prior to visit  He has No Known Allergies       Review of Systems   Constitutional: Negative for activity change, chills, fatigue, fever and unexpected weight change  HENT: Positive for congestion (zoarida in the am) and sore throat  Negative for ear pain, postnasal drip, rhinorrhea and sinus pressure  Eyes: Negative for pain  Respiratory: Positive for cough (productive with yellow phlegm with occasional bloody streaks )  Negative for choking, chest tightness, shortness of breath and wheezing  Cardiovascular: Positive for chest pain (occasional chest pain on coughing - zoraida last night )  Negative for palpitations and leg swelling  Gastrointestinal: Negative for abdominal pain, constipation, diarrhea, nausea and vomiting  Genitourinary: Negative for dysuria and hematuria  Musculoskeletal: Negative for arthralgias, back pain, gait problem, joint swelling, myalgias and neck stiffness  Skin: Negative for pallor and rash  Neurological: Negative for dizziness, tremors, seizures, syncope, light-headedness and headaches  Hematological: Negative for adenopathy  Psychiatric/Behavioral: Negative for behavioral problems  Objective:      /76 (BP Location: Left arm, Patient Position: Sitting, Cuff Size: Standard)   Pulse 66   Temp 98 3 °F (36 8 °C) (Temporal)   Ht 5' 11" (1 803 m)   Wt 79 4 kg (175 lb)   SpO2 97%   BMI 24 41 kg/m²          Physical Exam  Constitutional:       General: He is not in acute distress  Appearance: He is well-developed  He is not diaphoretic  HENT:      Head: Normocephalic and atraumatic  Right Ear: External ear normal       Left Ear: External ear normal       Nose: Mucosal edema and congestion present        Right Turbinates: Enlarged and swollen  Left Turbinates: Enlarged and swollen  Mouth/Throat:      Mouth: Mucous membranes are dry  Pharynx: Posterior oropharyngeal erythema (oropharyngeal erythema with dry mucous membranes ) present  No oropharyngeal exudate  Eyes:      General: No scleral icterus  Right eye: No discharge  Left eye: No discharge  Conjunctiva/sclera: Conjunctivae normal       Pupils: Pupils are equal, round, and reactive to light  Neck:      Thyroid: No thyromegaly  Vascular: No JVD  Trachea: No tracheal deviation  Cardiovascular:      Rate and Rhythm: Normal rate and regular rhythm  Heart sounds: Normal heart sounds  No murmur heard  No friction rub  No gallop  Pulmonary:      Effort: Pulmonary effort is normal  No respiratory distress  Breath sounds: Normal breath sounds  No wheezing or rales  Chest:      Chest wall: No tenderness  Abdominal:      General: Bowel sounds are normal  There is no distension  Palpations: Abdomen is soft  There is no mass  Tenderness: There is no abdominal tenderness  There is no guarding or rebound  Musculoskeletal:         General: No tenderness or deformity  Normal range of motion  Cervical back: Normal range of motion and neck supple  Lymphadenopathy:      Cervical: No cervical adenopathy  Skin:     General: Skin is warm and dry  Coloration: Skin is not pale  Findings: No erythema or rash  Neurological:      Mental Status: He is alert and oriented to person, place, and time  Cranial Nerves: No cranial nerve deficit  Motor: No abnormal muscle tone  Coordination: Coordination normal       Deep Tendon Reflexes: Reflexes are normal and symmetric        Comments: Cranial nerves 2-12 are intact bilaterally  Muscle strength is 5/5 in all extremities  Sensation is intact in bilateral face and extremities  Rapid alternating movement and finger-to-nose pointing test intact   Deep tendon reflexes are 2+ bilaterally  Gait is intact       Psychiatric:         Behavior: Behavior normal            Orders Only on 09/13/2022   Component Date Value Ref Range Status   • Hemoglobin A1C 09/13/2022 4 6   Final

## 2022-12-29 ENCOUNTER — APPOINTMENT (OUTPATIENT)
Dept: LAB | Age: 32
End: 2022-12-29

## 2022-12-29 DIAGNOSIS — E55.9 VITAMIN D DEFICIENCY: ICD-10-CM

## 2022-12-29 DIAGNOSIS — Z11.59 ENCOUNTER FOR HEPATITIS C SCREENING TEST FOR LOW RISK PATIENT: ICD-10-CM

## 2022-12-29 LAB
25(OH)D3 SERPL-MCNC: 35.8 NG/ML (ref 30–100)
HCV AB SER QL: NORMAL

## 2023-02-12 PROBLEM — J06.9 URTI (ACUTE UPPER RESPIRATORY INFECTION): Status: RESOLVED | Noted: 2022-12-14 | Resolved: 2023-02-12

## 2024-02-21 PROBLEM — Z13.220 LIPID SCREENING: Status: RESOLVED | Noted: 2018-09-12 | Resolved: 2024-02-21

## 2024-02-21 PROBLEM — Z11.3 SCREENING EXAMINATION FOR STD (SEXUALLY TRANSMITTED DISEASE): Status: RESOLVED | Noted: 2018-09-12 | Resolved: 2024-02-21

## 2024-02-21 PROBLEM — R05.9 COUGH: Status: RESOLVED | Noted: 2020-06-01 | Resolved: 2024-02-21

## 2024-05-02 ENCOUNTER — APPOINTMENT (EMERGENCY)
Dept: RADIOLOGY | Facility: HOSPITAL | Age: 34
End: 2024-05-02
Payer: COMMERCIAL

## 2024-05-02 ENCOUNTER — HOSPITAL ENCOUNTER (EMERGENCY)
Facility: HOSPITAL | Age: 34
Discharge: HOME | End: 2024-05-02
Attending: EMERGENCY MEDICINE
Payer: COMMERCIAL

## 2024-05-02 VITALS
RESPIRATION RATE: 16 BRPM | BODY MASS INDEX: 24.23 KG/M2 | OXYGEN SATURATION: 100 % | DIASTOLIC BLOOD PRESSURE: 92 MMHG | SYSTOLIC BLOOD PRESSURE: 154 MMHG | HEART RATE: 103 BPM | WEIGHT: 173.72 LBS | TEMPERATURE: 98.4 F

## 2024-05-02 DIAGNOSIS — S69.92XA INJURY OF LEFT INDEX FINGER, INITIAL ENCOUNTER: ICD-10-CM

## 2024-05-02 DIAGNOSIS — M54.50 LOW BACK PAIN: ICD-10-CM

## 2024-05-02 DIAGNOSIS — S93.402A LEFT ANKLE SPRAIN: ICD-10-CM
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
## 2024-05-02 DIAGNOSIS — M25.561 RIGHT KNEE PAIN: ICD-10-CM

## 2024-05-02 PROCEDURE — 72100 X-RAY EXAM L-S SPINE 2/3 VWS: CPT

## 2024-05-02 PROCEDURE — 73610 X-RAY EXAM OF ANKLE: CPT

## 2024-05-02 PROCEDURE — 73140 X-RAY EXAM OF FINGER(S): CPT

## 2024-05-02 PROCEDURE — 73564 X-RAY EXAM KNEE 4 OR MORE: CPT

## 2024-05-02 PROCEDURE — 99284 EMERGENCY DEPT VISIT MOD MDM: CPT

## 2024-05-02 RX ORDER — METHOCARBAMOL 500 MG/1
500 TABLET, FILM COATED ORAL 2 TIMES DAILY
Qty: 20 TABLET | Refills: 0 | Status: SHIPPED | OUTPATIENT
Start: 2024-05-02

## 2024-05-02 RX ORDER — LIDOCAINE 50 MG/G
2 PATCH TOPICAL ONCE
Status: DISCONTINUED | OUTPATIENT
Start: 2024-05-02 | End: 2024-05-02 | Stop reason: HOSPADM

## 2024-05-02 RX ORDER — IBUPROFEN 600 MG/1
600 TABLET ORAL ONCE
Status: COMPLETED | OUTPATIENT
Start: 2024-05-02 | End: 2024-05-02

## 2024-05-02 RX ORDER — NAPROXEN 500 MG/1
500 TABLET ORAL 2 TIMES DAILY WITH MEALS
Qty: 30 TABLET | Refills: 0 | Status: SHIPPED | OUTPATIENT
Start: 2024-05-02

## 2024-05-02 RX ADMIN — LIDOCAINE 5% 2 PATCH: 700 PATCH TOPICAL at 15:29

## 2024-05-02 RX ADMIN — IBUPROFEN 600 MG: 600 TABLET, FILM COATED ORAL at 15:28

## 2024-05-02 NOTE — Clinical Note
Ajit Holman was seen and treated in our emergency department on 5/2/2024.                Diagnosis:     Ajit  may return to work on return date.    He may return on this date: 05/03/2024         If you have any questions or concerns, please don't hesitate to call.      Lydia Keller PA-C    ______________________________           _______________          _______________  Hospital Representative                              Date                                Time
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
## 2024-11-22 ENCOUNTER — TELEPHONE (OUTPATIENT)
Dept: INTERNAL MEDICINE CLINIC | Age: 34
End: 2024-11-22

## 2024-12-10 ENCOUNTER — OFFICE VISIT (OUTPATIENT)
Dept: INTERNAL MEDICINE CLINIC | Age: 34
End: 2024-12-10
Payer: COMMERCIAL

## 2024-12-10 VITALS
DIASTOLIC BLOOD PRESSURE: 64 MMHG | BODY MASS INDEX: 24.78 KG/M2 | SYSTOLIC BLOOD PRESSURE: 120 MMHG | TEMPERATURE: 98.4 F | OXYGEN SATURATION: 99 % | HEIGHT: 71 IN | WEIGHT: 177 LBS | HEART RATE: 82 BPM

## 2024-12-10 DIAGNOSIS — E55.9 VITAMIN D DEFICIENCY: ICD-10-CM

## 2024-12-10 DIAGNOSIS — Z00.00 ANNUAL PHYSICAL EXAM: Primary | ICD-10-CM

## 2024-12-10 DIAGNOSIS — Z13.9 SCREENING DUE: ICD-10-CM

## 2024-12-10 PROCEDURE — 99395 PREV VISIT EST AGE 18-39: CPT | Performed by: INTERNAL MEDICINE

## 2024-12-10 NOTE — PROGRESS NOTES
Adult Annual Physical  Name: Ajit Holman      : 1990      MRN: [id number]  Encounter Provider: Ricco Brar DO  Encounter Date: 12/10/2024   Encounter department: [facility]    Assessment & Plan  Annual physical exam  34-year-old male with no significant past medical history presenting here for annual physical.  Last seen in the office 2022.  He has no complaints today.  Last A1c from 2022 was 4.6, TSH from .5 and previous 0.3.  Will recheck TSH.  Lipid panel from  revealed total cholesterol 180 with elevated LDL of 132.  No significant familial cardiac history, does report her mother has hypertension and grandma has diabetes.  He is a never smoker, drinks 1-2 beers every 2 weeks and no drugs.  He refuses all immunizations today.  Will get CBC CMP TSH and recheck vitamin D as he has a history of vitamin D deficiency       Screening due    Orders:    CBC and differential; Future    Comprehensive metabolic panel; Future    Vitamin D 25 hydroxy; Future    TSH, 3rd generation with Free T4 reflex; Future    Vitamin D deficiency  Last vitamin D checked in 2022 was 35 week course of high-dose vitamin D       Immunizations and preventive care screenings were discussed with patient today. Appropriate education was printed on patient's after visit summary.    Counseling:  Alcohol/drug use: discussed moderation in alcohol intake, the recommendations for healthy alcohol use, and avoidance of illicit drug use.  Dental Health: discussed importance of regular tooth brushing, flossing, and dental visits.  Injury prevention: discussed safety/seat belts, safety helmets, smoke detectors, carbon monoxide detectors, and smoking near bedding or upholstery.  Sexual health: discussed sexually transmitted diseases, partner selection, use of condoms, avoidance of unintended pregnancy, and contraceptive alternatives.  Exercise: the importance of regular exercise/physical  activity was discussed. Recommend exercise 3-5 times per week for at least 30 minutes.       Depression Screening and Follow-up Plan: Patient was screened for depression during today's encounter. They screened negative with a PHQ-2 score of 0.        History of Present Illness     Adult Annual Physical:  Patient presents for annual physical. 34-year-old male with no significant past medical history presenting here for annual physical.  Last seen in the office 12/2022.  He has no complaints today.  Last A1c from 9/2022 was 4.6, TSH from 2020.5 and previous 0.3.  Will recheck TSH.  Lipid panel from 2020 revealed total cholesterol 180 with elevated LDL of 132.  No significant familial cardiac history, does report her mother has hypertension and grandma has diabetes.  He is a never smoker, drinks 1-2 beers every 2 weeks and no drugs.  He refuses all immunizations today.  Will get CBC CMP TSH and recheck vitamin D as he has a history of vitamin D deficiency.     Diet and Physical Activity:  - Diet/Nutrition: well balanced diet.  - Exercise: no formal exercise.    Depression Screening:  - PHQ-2 Score: 0    General Health:  - Sleep: sleeps well.  - Hearing: normal hearing bilateral ears.  - Vision: no vision problems.  - Dental: regular dental visits.    Review of Systems   Constitutional:  Negative for chills and fever.   HENT:  Negative for ear pain and sore throat.    Eyes:  Negative for pain and visual disturbance.   Respiratory:  Negative for cough and shortness of breath.    Cardiovascular:  Negative for chest pain and palpitations.   Gastrointestinal:  Negative for abdominal pain and vomiting.   Genitourinary:  Negative for dysuria and hematuria.   Musculoskeletal:  Negative for arthralgias and back pain.   Skin:  Negative for color change and rash.   Neurological:  Negative for seizures and syncope.   All other systems reviewed and are negative.        Objective   /64 (BP Location: Left arm, Patient Position:  "Sitting, Cuff Size: Standard)   Pulse 82   Temp 98.4 °F (36.9 °C) (Temporal)   Ht 5' 11\" (1.803 m)   Wt 80.3 kg (177 lb)   SpO2 99%   BMI 24.69 kg/m²     Physical Exam  Vitals and nursing note reviewed.   Constitutional:       General: He is not in acute distress.     Appearance: He is well-developed.   HENT:      Head: Normocephalic and atraumatic.      Right Ear: Tympanic membrane, ear canal and external ear normal. There is no impacted cerumen.      Left Ear: Tympanic membrane, ear canal and external ear normal. There is no impacted cerumen.      Nose: Nose normal. No congestion or rhinorrhea.      Mouth/Throat:      Mouth: Mucous membranes are moist.      Pharynx: Oropharynx is clear. No oropharyngeal exudate or posterior oropharyngeal erythema.   Eyes:      Conjunctiva/sclera: Conjunctivae normal.   Cardiovascular:      Rate and Rhythm: Normal rate and regular rhythm.      Heart sounds: No murmur heard.  Pulmonary:      Effort: Pulmonary effort is normal. No respiratory distress.      Breath sounds: Normal breath sounds.   Abdominal:      General: Abdomen is flat. There is no distension.      Palpations: Abdomen is soft.      Tenderness: There is no abdominal tenderness.   Musculoskeletal:         General: No swelling or tenderness.      Cervical back: Neck supple.      Right lower leg: No edema.      Left lower leg: No edema.   Skin:     General: Skin is warm and dry.      Capillary Refill: Capillary refill takes less than 2 seconds.   Neurological:      General: No focal deficit present.      Mental Status: He is alert.   Psychiatric:         Mood and Affect: Mood normal.         "

## 2024-12-10 NOTE — ASSESSMENT & PLAN NOTE
34-year-old male with no significant past medical history presenting here for annual physical.  Last seen in the office 12/2022.  He has no complaints today.  Last A1c from 9/2022 was 4.6, TSH from 2020.5 and previous 0.3.  Will recheck TSH.  Lipid panel from 2020 revealed total cholesterol 180 with elevated LDL of 132.  No significant familial cardiac history, does report her mother has hypertension and grandma has diabetes.  He is a never smoker, drinks 1-2 beers every 2 weeks and no drugs.  He refuses all immunizations today.  Will get CBC CMP TSH and recheck vitamin D as he has a history of vitamin D deficiency

## 2024-12-10 NOTE — PATIENT INSTRUCTIONS
"Patient Education     Routine physical for adults   The Basics   Written by the doctors and editors at [facility]   What is a physical? -- A physical is a routine visit, or \"check-up,\" with your doctor. You might also hear it called a \"wellness visit\" or \"preventive visit.\"  During each visit, the doctor will:   Ask about your physical and mental health   Ask about your habits, behaviors, and lifestyle   Do an exam   Give you vaccines if needed   Talk to you about any medicines you take   Give advice about your health   Answer your questions  Getting regular check-ups is an important part of taking care of your health. It can help your doctor find and treat any problems you have. But it's also important for preventing health problems.  A routine physical is different from a \"sick visit.\" A sick visit is when you see a doctor because of a health concern or problem. Since physicals are scheduled ahead of time, you can think about what you want to ask the doctor.  How often should I get a physical? -- It depends on your age and health. In general, for people age 21 years and older:   If you are younger than 50 years, you might be able to get a physical every 3 years.   If you are 50 years or older, your doctor might recommend a physical every year.  If you have an ongoing health condition, like diabetes or high blood pressure, your doctor will probably want to see you more often.  What happens during a physical? -- In general, each visit will include:   Physical exam - The doctor or nurse will check your height, weight, heart rate, and blood pressure. They will also look at your eyes and ears. They will ask about how you are feeling and whether you have any symptoms that bother you.   Medicines - It's a good idea to bring a list of all the medicines you take to each doctor visit. Your doctor will talk to you about your medicines and answer any questions. Tell them if you are having any side effects that bother you. You " "should also tell them if you are having trouble paying for any of your medicines.   Habits and behaviors - This includes:   Your diet   Your exercise habits   Whether you smoke, drink alcohol, or use drugs   Whether you are sexually active   Whether you feel safe at home  Your doctor will talk to you about things you can do to improve your health and lower your risk of health problems. They will also offer help and support. For example, if you want to quit smoking, they can give you advice and might prescribe medicines. If you want to improve your diet or get more physical activity, they can help you with this, too.   Lab tests, if needed - The tests you get will depend on your age and situation. For example, your doctor might want to check your:   Cholesterol   Blood sugar   Iron level   Vaccines - The recommended vaccines will depend on your age, health, and what vaccines you already had. Vaccines are very important because they can prevent certain serious or deadly infections.   Discussion of screening - \"Screening\" means checking for diseases or other health problems before they cause symptoms. Your doctor can recommend screening based on your age, risk, and preferences. This might include tests to check for:   Cancer, such as breast, prostate, cervical, ovarian, colorectal, prostate, lung, or skin cancer   Sexually transmitted infections, such as chlamydia and gonorrhea   Mental health conditions like depression and anxiety  Your doctor will talk to you about the different types of screening tests. They can help you decide which screenings to have. They can also explain what the results might mean.   Answering questions - The physical is a good time to ask the doctor or nurse questions about your health. If needed, they can refer you to other doctors or specialists, too.  Adults older than 65 years often need other care, too. As you get older, your doctor will talk to you about:   How to prevent falling at " home   Hearing or vision tests   Memory testing   How to take your medicines safely   Making sure that you have the help and support you need at home  All topics are updated as new evidence becomes available and our peer review process is complete.  This topic retrieved from [employer] on: May 02, 2024.  Topic 266260 Version 1.0  Release: 32.4.3 - C32.122  © 2024 UpToDate, Inc. and/or its affiliates. All rights reserved.  Consumer Information Use and Disclaimer   Disclaimer: This generalized information is a limited summary of diagnosis, treatment, and/or medication information. It is not meant to be comprehensive and should be used as a tool to help the user understand and/or assess potential diagnostic and treatment options. It does NOT include all information about conditions, treatments, medications, side effects, or risks that may apply to a specific patient. It is not intended to be medical advice or a substitute for the medical advice, diagnosis, or treatment of a health care provider based on the health care provider's examination and assessment of a patient's specific and unique circumstances. Patients must speak with a health care provider for complete information about their health, medical questions, and treatment options, including any risks or benefits regarding use of medications. This information does not endorse any treatments or medications as safe, effective, or approved for treating a specific patient. UpToDate, Inc. and its affiliates disclaim any warranty or liability relating to this information or the use thereof.The use of this information is governed by the Terms of Use, available at https://www.woltersES Holdingsuwer.com/en/know/clinical-effectiveness-terms. 2024© UpToDate, Inc. and its affiliates and/or licensors. All rights reserved.  Copyright   © 2024 UpToDate, Inc. and/or its affiliates. All rights reserved.

## 2024-12-13 ENCOUNTER — APPOINTMENT (OUTPATIENT)
Dept: LAB | Facility: CLINIC | Age: 34
End: 2024-12-13
Payer: COMMERCIAL

## 2024-12-13 DIAGNOSIS — Z13.9 SCREENING DUE: ICD-10-CM

## 2024-12-13 LAB
25(OH)D3 SERPL-MCNC: 32.6 NG/ML (ref 30–100)
ALBUMIN SERPL BCG-MCNC: 5 G/DL (ref 3.5–5)
ALP SERPL-CCNC: 35 U/L (ref 34–104)
ALT SERPL W P-5'-P-CCNC: 22 U/L (ref 7–52)
ANION GAP SERPL CALCULATED.3IONS-SCNC: 9 MMOL/L (ref 4–13)
AST SERPL W P-5'-P-CCNC: 17 U/L (ref 13–39)
BASOPHILS # BLD AUTO: 0.06 THOUSANDS/ÂΜL (ref 0–0.1)
BASOPHILS NFR BLD AUTO: 1 % (ref 0–1)
BILIRUB SERPL-MCNC: 0.81 MG/DL (ref 0.2–1)
BUN SERPL-MCNC: 12 MG/DL (ref 5–25)
CALCIUM SERPL-MCNC: 9.9 MG/DL (ref 8.4–10.2)
CHLORIDE SERPL-SCNC: 101 MMOL/L (ref 96–108)
CO2 SERPL-SCNC: 31 MMOL/L (ref 21–32)
CREAT SERPL-MCNC: 0.98 MG/DL (ref 0.6–1.3)
EOSINOPHIL # BLD AUTO: 0.18 THOUSAND/ÂΜL (ref 0–0.61)
EOSINOPHIL NFR BLD AUTO: 4 % (ref 0–6)
ERYTHROCYTE [DISTWIDTH] IN BLOOD BY AUTOMATED COUNT: 11.9 % (ref 11.6–15.1)
GFR SERPL CREATININE-BSD FRML MDRD: 100 ML/MIN/1.73SQ M
GLUCOSE SERPL-MCNC: 83 MG/DL (ref 65–140)
HCT VFR BLD AUTO: 46.9 % (ref 36.5–49.3)
HGB BLD-MCNC: 15.2 G/DL (ref 12–17)
IMM GRANULOCYTES # BLD AUTO: 0.01 THOUSAND/UL (ref 0–0.2)
IMM GRANULOCYTES NFR BLD AUTO: 0 % (ref 0–2)
LYMPHOCYTES # BLD AUTO: 2.56 THOUSANDS/ÂΜL (ref 0.6–4.47)
LYMPHOCYTES NFR BLD AUTO: 49 % (ref 14–44)
MCH RBC QN AUTO: 29.3 PG (ref 26.8–34.3)
MCHC RBC AUTO-ENTMCNC: 32.4 G/DL (ref 31.4–37.4)
MCV RBC AUTO: 90 FL (ref 82–98)
MONOCYTES # BLD AUTO: 0.34 THOUSAND/ÂΜL (ref 0.17–1.22)
MONOCYTES NFR BLD AUTO: 7 % (ref 4–12)
NEUTROPHILS # BLD AUTO: 2.04 THOUSANDS/ÂΜL (ref 1.85–7.62)
NEUTS SEG NFR BLD AUTO: 39 % (ref 43–75)
NRBC BLD AUTO-RTO: 0 /100 WBCS
PLATELET # BLD AUTO: 215 THOUSANDS/UL (ref 149–390)
PMV BLD AUTO: 11.5 FL (ref 8.9–12.7)
POTASSIUM SERPL-SCNC: 3.9 MMOL/L (ref 3.5–5.3)
PROT SERPL-MCNC: 8 G/DL (ref 6.4–8.4)
RBC # BLD AUTO: 5.19 MILLION/UL (ref 3.88–5.62)
SODIUM SERPL-SCNC: 141 MMOL/L (ref 135–147)
TSH SERPL DL<=0.05 MIU/L-ACNC: 0.59 UIU/ML (ref 0.45–4.5)
WBC # BLD AUTO: 5.19 THOUSAND/UL (ref 4.31–10.16)

## 2024-12-13 PROCEDURE — 85025 COMPLETE CBC W/AUTO DIFF WBC: CPT

## 2024-12-13 PROCEDURE — 84443 ASSAY THYROID STIM HORMONE: CPT

## 2024-12-13 PROCEDURE — 36415 COLL VENOUS BLD VENIPUNCTURE: CPT

## 2024-12-13 PROCEDURE — 80053 COMPREHEN METABOLIC PANEL: CPT

## 2024-12-13 PROCEDURE — 82306 VITAMIN D 25 HYDROXY: CPT

## 2025-03-21 ENCOUNTER — TELEPHONE (OUTPATIENT)
Age: 35
End: 2025-03-21